# Patient Record
Sex: MALE
[De-identification: names, ages, dates, MRNs, and addresses within clinical notes are randomized per-mention and may not be internally consistent; named-entity substitution may affect disease eponyms.]

---

## 2021-07-26 NOTE — NUR
SHIFT SUMMARY;
 
ER ADMIT, TO ICU VIA GURNEY, TRANSFERS FROM GURNEY TO BED WITHOUT DIFFICULTY.
DENIES CHEST PAIN OR SOB. A/A/OX4. NITRO STARTED PER EMAR AT 20MCG/MIN. NS
INFUSING AT 100ML/HR. SPOUSE AT BEDSIDE. NPO PENDING ANGIO. VSS, WILL CONTINUE
TO MONITOR UNTIL CHANGE OF SHIFT.

## 2021-07-26 NOTE — NUR
DR ZAZUETA CALLED ICU TO ADVISE THAT PLAN IS TO TRANSFER PT TO [facility],
ACCEPTING DR TWILA GOMEZ. WILL CALL [facility] FOR TRANSFER IMAGES,
CHARGE RN NOTIFIED OF PLAN. DR ZAZUETA STATES THAT HE PLACED ORDERS TO DC
NITRO GTT AND ADMINISTER IMDUR, OK FOR CARDIAC DIET TONIGHT.

## 2021-07-26 NOTE — NUR
PT RETURNS TO ICU 14 POST ANGIOGRAM, PER [facility] RN, NO INTERVENTIONS IN
CATH LAB. PT IS NOTED TO HAVE TR BAND IN PLACE TO RIGHT RADIAL ACCESS SITE
WHICH IS STABLE WITHOUT EVIDENCE OF BLEEDING AT THIS TIME, PT DENIES
NUMBNESS/TINGLING TO FINGERS AND THUMB OF RIGHT HAND, SKIN IS PINK, COOL, AND
DRY, CAP REFILL IS 3 SECONDS, BIOX PROBE IN PLACE TO FINGERS OF RIGHT HAND,
PLETH PRESENT.  PT DENIES CURRENT CP/PRESSURE, DENIES SOB/DYSPNEA AT REST,
DENIES N/V, ADMITS TO 9/10 LOW BACK PAIN WHICH HE STATES IS NEAR BASELINE FOR
HIM AND PRESENT FOR YEARS. SINUS TACH NOTED ON MONITOR WITH OCCASIONAL PVCS,
RATE 110S, PRESSURES ARE MAINTAINING WITH NITRO GTT AT 15 MCG/MIN, PULSES ARE
FULL X 4 EXTREMITIES, NO EDEMA IS NOTED. LUNGS CLEAR THROUGHOUT, SATS LOW TO
MID 90S ON ROOM AIR, NO VISIBLE INCREASED WORK OF BREATHING AT REST, SPEAKING
IN FULL SENTENCES. ACTIVE BOWEL TONES X 4 QUADRANTS, PT STATES THAT HE IS
QUITE HUNGRY AS HE HASN'T EATEN SINCE 1700 YESTERDAY, NO GRIMACING WITH
PALPATION, ABD SOFT. NS INFUSING  ML/HR PER VERBAL ORDER FROM DR ZAZUETA. SINGLE FIELD START IV ACCESS IS NOTED TO LEFT AC, SITE WNL,
INFUSING NS AT NITRO GTT AT THIS TIME, DRESSING CDI, WILL PLAN TO START SECOND
IV WITH NEXT LAB DRAW.

## 2021-07-26 NOTE — NUR
TR BAND DEFLATION
TR BAND DEFLATION STARTED AT 2207, 1 ML AIR REMOVED EVERY 5 MINUTES, DEFLATION
IS COMPLETE AND SITE REMAINS STABLE AT THIS TIME, WILL CONTINUE FREQUENT
MONITORING FOR EVIDENCE OF BLEEDING.  SMALL HEMATOMA NOTED AT PUNCTURE SITE TO
RIGHT AC HAS SMALL HEMATOMA, REMAINS WITHIN MARKED LINE SINCE ARRIVAL. WILL
CONT TO MONITOR. TR BAND PRECAUTIONS FREQUENTLY REINFORCED WITH PT, ARM BOARD
REMAINS IN PLACE.

## 2021-07-27 NOTE — NUR
PT RESTS QUIETLY THROUGHOUT SHIFT, CONTINUES TO DENY CP/PRESSURE, ADMITS TO
SHORTNESS OF BREATH WITH INCREASED ACTIVITY ONLY. RIGHT RADIAL ACCESS SITE HAS
REMAINED STABLE THROUGHOUT SHIFT, INITIALLY A SMALL AMOUNT OF CAPILLARY OOZING
WAS NOTED UPON DISCONTINUATION OF TR BAND HOWEVER THIS RESOVED WITH 50 SECONDS
OF PRESSURE HELD WITH STERILE 4X4 GAUZE.  THERE IS A SMALL AREA OF BRUISING TO
RIGHT AC THAT HAS BEEN PRESENT SINCE PT'S RETURN FROM CATH LAB, THIS HAS
REMAINED WITHIN THE MARKED OUTLINE ON THE CLEAR TEGADERM DRESSING. PT PRESSURE
MAINTAINTS STABLE THROUGHOUT NOC, CONTINUES IN SINUS RHYTHM WITH OCCASIONAL
PVCS, RATE HAS IMPROVED FROM 110S TO 90S AT THIS TIME. CONTINUES TO MAINTAIN
SATS ON ROOM AIR AND SPEAK IN FULL SENTENCES WITHOUT VISIBLE INCREASED WORK OF
BREATHING, RARE COUGH HAS BEEN NOTED. CONTINUE TO AWAIT TRANSFER TO [facility].

## 2021-07-27 NOTE — NUR
ASSUMED CARE / DR ZAZUETA:
REPORT RECEIVED FROM CHATO RICHARDS RN. ASSUMED CARE OF THIS PT AT APPROX [phone].
ON ASSESSMENT, THE PT IS AWAKE, ALERT & ORIENTED TO ALL. HE STS HAVING 6/10
CP THAT FEELS "TIGHT" & IS CONSTANT. LS CLEAR T/O, PT ON RA W/ O2 SATS > 925.
MONITOR SHOWS SR W/ OCCASIONAL PVCs, HR 80-90s, -140s. PT HAS NO GI
COMPLAINTS. VOIDS W/O DIFFICULTY USING URINAL, PER REPORT. SKIN CONDITION
OVERALL CDI. ANGIOGRAM PUCTURE SITE TO R RADIAL IS WNL; NO BLEEDING, BRUISING
OR HEMATOMA FORMATION NOTED. PUNCTURE SITE TO R BRACHIAL W/ TEGADERM IN PLACE,
SMALL HEMATOMA NOTED UNDER DRESSING. HEMATOMA IMPROVED PER REPORT & IS NOW
SOFT TO PALPATION, BRUISING HAS SPREAD SLIGHTLY OUTSIDE OF MARKED AREA.
 
CALL TO DR ZAZUETA REGARDING PT's CURRENT 6/10 CP. ORDERS PLACED FOR
NITROGLYCERIN DRIP, ONE TIME DOSE OF IV ATIVAN, ONE TIME DOSE OF IV
METOPROLOL. PROVIDER STS OKAY FOR PT TO HAVE SCHEDULED PO DOSE OF METOPROLOL
THIS AM ALSO.
WILL CONTINUE TO MONITOR & UPDATE AS NEEDED.

## 2021-07-27 NOTE — NUR
TR BAND REMOVAL
RIGHT RADIAL ACCESS SITE CONTINUES STABLE, TR BAND REMOVED, WRIST CLEANSED
WITH CHLORAPREP, AIR DRIED, AND TEGADERM DRESSING APPLIED. SITE NOTED TO HAVE
SMALL AMOUNT OF CAPILLARY OOZING, NO SWELLING, PAIN, OR HEMATOMA FORMATION
NOTED, TEGADERM DRESSING REMOVED, LIGHT PRESSURE HELD WITH STERILE 4X4 GAUZE X
50 SECONDS, CAPILLARY OOZING IS NOTED TO HAVE RESOLVED, TEGADERM DRESSING
REPLACED, WILL CONT TO MONITOR CLOSELY.

## 2021-07-27 NOTE — NUR
[facility].
THE BED ASSIGNED IS CCU-6. TRANSPORT HAS BEEN CONTACTED & ARRIVED AT 1210 TO
TAKE PT VIA GROUND. THE PT's WIFE HAS BEEN NOTIFIED OF TRANSFER TIME. PT OUT
OF UNIT AT 1225. BELONGINGS & TRANSFER PACKET HAVE BEEN TAKEN OUT W/ PT AT
THAT TIME. HEPARIN INFUSING AT 14 U/KG/HR & NITROGLYCERIN INFUSING AT 10
MCG/MIN. PARAMEDICS UPDATED ON TITRATION & PARAMETERS FOR NITROGLYCERIN DRIP.
THIS RN HAS GIVEN REPORT TO FERCHO LEIJA TO ASSUME CARE AT [facility].

## 2023-01-26 ENCOUNTER — HOSPITAL ENCOUNTER (OUTPATIENT)
Dept: HOSPITAL 95 - LAB SHORT | Age: 78
Discharge: HOME | End: 2023-01-26
Attending: FAMILY MEDICINE
Payer: MEDICARE

## 2023-01-26 DIAGNOSIS — Z51.81: Primary | ICD-10-CM

## 2023-01-26 DIAGNOSIS — Z79.899: ICD-10-CM

## 2023-01-26 LAB
ANION GAP SERPL CALCULATED.4IONS-SCNC: 7 MMOL/L (ref 6–16)
BUN SERPL-MCNC: 21 MG/DL (ref 8–24)
CALCIUM SERPL-MCNC: 8.7 MG/DL (ref 8.5–10.1)
CHLORIDE SERPL-SCNC: 101 MMOL/L (ref 98–108)
CO2 SERPL-SCNC: 28 MMOL/L (ref 21–32)
CREAT SERPL-MCNC: 1.49 MG/DL (ref 0.6–1.2)
GLUCOSE SERPL-MCNC: 247 MG/DL (ref 70–99)
MAGNESIUM SERPL-MCNC: 2 MG/DL (ref 1.6–2.4)
POTASSIUM SERPL-SCNC: 3.8 MMOL/L (ref 3.5–5.5)
SODIUM SERPL-SCNC: 136 MMOL/L (ref 136–145)

## 2023-08-15 ENCOUNTER — HOSPITAL ENCOUNTER (OUTPATIENT)
Dept: HOSPITAL 95 - LAB | Age: 78
Discharge: HOME | End: 2023-08-15
Attending: FAMILY MEDICINE
Payer: OTHER GOVERNMENT

## 2023-08-15 DIAGNOSIS — E11.9: Primary | ICD-10-CM

## 2023-08-15 DIAGNOSIS — E78.5: ICD-10-CM

## 2023-08-15 DIAGNOSIS — E53.8: ICD-10-CM

## 2023-08-15 DIAGNOSIS — L08.9: ICD-10-CM

## 2023-08-15 LAB
ALBUMIN SERPL BCP-MCNC: 3.8 G/DL (ref 3.4–5)
ALBUMIN/GLOB SERPL: 1 {RATIO} (ref 0.8–1.8)
ALT SERPL W P-5'-P-CCNC: 30 U/L (ref 12–78)
ANION GAP SERPL CALCULATED.4IONS-SCNC: 5 MMOL/L (ref 6–16)
AST SERPL W P-5'-P-CCNC: 22 U/L (ref 12–37)
BILIRUB SERPL-MCNC: 1 MG/DL (ref 0.1–1)
BUN SERPL-MCNC: 21 MG/DL (ref 8–24)
CALCIUM SERPL-MCNC: 9.2 MG/DL (ref 8.5–10.1)
CHLORIDE SERPL-SCNC: 105 MMOL/L (ref 98–108)
CHOLEST SERPL-MCNC: 114 MG/DL (ref 50–200)
CHOLEST/HDLC SERPL: 1.5 {RATIO}
CO2 SERPL-SCNC: 28 MMOL/L (ref 21–32)
CREAT SERPL-MCNC: 1.47 MG/DL (ref 0.6–1.2)
GLOBULIN SER CALC-MCNC: 3.7 G/DL (ref 2.2–4)
GLUCOSE SERPL-MCNC: 95 MG/DL (ref 70–99)
HDLC SERPL-MCNC: 78 MG/DL (ref 39–?)
LDLC SERPL CALC-MCNC: 21 MG/DL (ref 0–110)
LDLC/HDLC SERPL: 0.3 {RATIO}
POTASSIUM SERPL-SCNC: 3.7 MMOL/L (ref 3.5–5.5)
PROT SERPL-MCNC: 7.5 G/DL (ref 6.4–8.2)
SODIUM SERPL-SCNC: 138 MMOL/L (ref 136–145)
TRIGL SERPL-MCNC: 73 MG/DL (ref 30–160)
VLDLC SERPL CALC-MCNC: 14 MG/DL (ref 6–32)

## 2023-10-12 ENCOUNTER — HOSPITAL ENCOUNTER (OUTPATIENT)
Dept: HOSPITAL 95 - LAB SHORT | Age: 78
End: 2023-10-12
Attending: FAMILY MEDICINE
Payer: COMMERCIAL

## 2023-10-12 DIAGNOSIS — S40.022A: Primary | ICD-10-CM

## 2023-10-12 LAB
BASOPHILS # BLD AUTO: 0.04 K/MM3 (ref 0–0.23)
BASOPHILS NFR BLD AUTO: 0 % (ref 0–2)
DEPRECATED RDW RBC AUTO: 46.9 FL (ref 35.1–46.3)
EOSINOPHIL # BLD AUTO: 0.28 K/MM3 (ref 0–0.68)
EOSINOPHIL NFR BLD AUTO: 3 % (ref 0–6)
ERYTHROCYTE [DISTWIDTH] IN BLOOD BY AUTOMATED COUNT: 14.6 % (ref 11.7–14.2)
HCT VFR BLD AUTO: 33.3 % (ref 37–53)
HGB BLD-MCNC: 11.5 G/DL (ref 13.5–17.5)
IMM GRANULOCYTES # BLD AUTO: 0.04 K/MM3 (ref 0–0.1)
IMM GRANULOCYTES NFR BLD AUTO: 0 % (ref 0–1)
LYMPHOCYTES # BLD AUTO: 1.12 K/MM3 (ref 0.84–5.2)
LYMPHOCYTES NFR BLD AUTO: 12 % (ref 21–46)
MCHC RBC AUTO-ENTMCNC: 34.5 G/DL (ref 31.5–36.5)
MCV RBC AUTO: 88 FL (ref 80–100)
MONOCYTES # BLD AUTO: 1.02 K/MM3 (ref 0.16–1.47)
MONOCYTES NFR BLD AUTO: 11 % (ref 4–13)
NEUTROPHILS # BLD AUTO: 7.14 K/MM3 (ref 1.96–9.15)
NEUTROPHILS NFR BLD AUTO: 74 % (ref 41–73)
NRBC # BLD AUTO: 0 K/MM3 (ref 0–0.02)
NRBC BLD AUTO-RTO: 0 /100 WBC (ref 0–0.2)
PLATELET # BLD AUTO: 202 K/MM3 (ref 150–400)

## 2023-10-31 ENCOUNTER — HOSPITAL ENCOUNTER (OUTPATIENT)
Dept: HOSPITAL 95 - LAB | Age: 78
Discharge: HOME | End: 2023-10-31
Attending: FAMILY MEDICINE
Payer: OTHER GOVERNMENT

## 2023-10-31 DIAGNOSIS — N18.30: Primary | ICD-10-CM

## 2023-10-31 LAB
ALBUMIN SERPL BCP-MCNC: 3.4 G/DL (ref 3.4–5)
ALBUMIN/GLOB SERPL: 0.9 {RATIO} (ref 0.8–1.8)
ALT SERPL W P-5'-P-CCNC: 33 U/L (ref 12–78)
ANION GAP SERPL CALCULATED.4IONS-SCNC: 3 MMOL/L (ref 6–16)
AST SERPL W P-5'-P-CCNC: 24 U/L (ref 12–37)
BASOPHILS # BLD AUTO: 0.04 K/MM3 (ref 0–0.23)
BASOPHILS NFR BLD AUTO: 1 % (ref 0–2)
BILIRUB SERPL-MCNC: 0.6 MG/DL (ref 0.1–1)
BUN SERPL-MCNC: 17 MG/DL (ref 8–24)
CALCIUM SERPL-MCNC: 9 MG/DL (ref 8.5–10.1)
CHLORIDE SERPL-SCNC: 106 MMOL/L (ref 98–108)
CO2 SERPL-SCNC: 29 MMOL/L (ref 21–32)
CREAT SERPL-MCNC: 1.25 MG/DL (ref 0.6–1.2)
CREAT UR-MCNC: 59.4 MG/DL (ref 27–270)
DEPRECATED RDW RBC AUTO: 56.2 FL (ref 35.1–46.3)
EOSINOPHIL # BLD AUTO: 0.21 K/MM3 (ref 0–0.68)
EOSINOPHIL NFR BLD AUTO: 4 % (ref 0–6)
ERYTHROCYTE [DISTWIDTH] IN BLOOD BY AUTOMATED COUNT: 16.1 % (ref 11.7–14.2)
GLOBULIN SER CALC-MCNC: 3.7 G/DL (ref 2.2–4)
GLUCOSE SERPL-MCNC: 86 MG/DL (ref 70–99)
HCT VFR BLD AUTO: 40.6 % (ref 37–53)
HGB BLD-MCNC: 13.1 G/DL (ref 13.5–17.5)
IMM GRANULOCYTES # BLD AUTO: 0.03 K/MM3 (ref 0–0.1)
IMM GRANULOCYTES NFR BLD AUTO: 1 % (ref 0–1)
LYMPHOCYTES # BLD AUTO: 1.19 K/MM3 (ref 0.84–5.2)
LYMPHOCYTES NFR BLD AUTO: 21 % (ref 21–46)
MCHC RBC AUTO-ENTMCNC: 32.3 G/DL (ref 31.5–36.5)
MCV RBC AUTO: 94 FL (ref 80–100)
MICROALBUMIN UR-MCNC: 32.2 MG/L (ref 0–20)
MONOCYTES # BLD AUTO: 0.68 K/MM3 (ref 0.16–1.47)
MONOCYTES NFR BLD AUTO: 12 % (ref 4–13)
NEUTROPHILS # BLD AUTO: 3.52 K/MM3 (ref 1.96–9.15)
NEUTROPHILS NFR BLD AUTO: 62 % (ref 41–73)
NRBC # BLD AUTO: 0 K/MM3 (ref 0–0.02)
NRBC BLD AUTO-RTO: 0 /100 WBC (ref 0–0.2)
PLATELET # BLD AUTO: 219 K/MM3 (ref 150–400)
POTASSIUM SERPL-SCNC: 3.9 MMOL/L (ref 3.5–5.5)
PROT SERPL-MCNC: 7.1 G/DL (ref 6.4–8.2)
SODIUM SERPL-SCNC: 138 MMOL/L (ref 136–145)